# Patient Record
Sex: FEMALE | URBAN - METROPOLITAN AREA
[De-identification: names, ages, dates, MRNs, and addresses within clinical notes are randomized per-mention and may not be internally consistent; named-entity substitution may affect disease eponyms.]

---

## 2024-08-12 ENCOUNTER — OFFICE VISIT (OUTPATIENT)
Dept: URBAN - METROPOLITAN AREA CLINIC 37 | Facility: CLINIC | Age: 79
End: 2024-08-12
Payer: MEDICARE

## 2024-08-12 ENCOUNTER — DASHBOARD ENCOUNTERS (OUTPATIENT)
Age: 79
End: 2024-08-12

## 2024-08-12 VITALS
WEIGHT: 129 LBS | HEART RATE: 84 BPM | BODY MASS INDEX: 22.02 KG/M2 | HEIGHT: 64 IN | OXYGEN SATURATION: 97 % | DIASTOLIC BLOOD PRESSURE: 88 MMHG | SYSTOLIC BLOOD PRESSURE: 128 MMHG

## 2024-08-12 DIAGNOSIS — Z12.11 COLON CANCER SCREENING: ICD-10-CM

## 2024-08-12 DIAGNOSIS — K59.09 INTERMITTENT CONSTIPATION: ICD-10-CM

## 2024-08-12 DIAGNOSIS — R10.13 EPIGASTRIC PAIN: ICD-10-CM

## 2024-08-12 DIAGNOSIS — R68.81 EARLY SATIETY: ICD-10-CM

## 2024-08-12 PROBLEM — 235595009: Status: ACTIVE | Noted: 2024-08-12

## 2024-08-12 PROBLEM — 305058001: Status: ACTIVE | Noted: 2024-08-12

## 2024-08-12 PROBLEM — 14760008: Status: ACTIVE | Noted: 2024-08-12

## 2024-08-12 PROBLEM — 442076002: Status: ACTIVE | Noted: 2024-08-12

## 2024-08-12 PROBLEM — 79922009: Status: ACTIVE | Noted: 2024-08-12

## 2024-08-12 PROCEDURE — 99204 OFFICE O/P NEW MOD 45 MIN: CPT | Performed by: HOSPITALIST

## 2024-08-12 RX ORDER — PANTOPRAZOLE 40 MG/1
TABLET, DELAYED RELEASE ORAL
Qty: 30 TABLET | Status: ACTIVE | COMMUNITY

## 2024-08-12 RX ORDER — SUCRALFATE ORAL 1 G/10ML
10 ML 1 HOUR BEFORE MEALS AND AT BEDTIME ON AN EMPTY STOMACH SUSPENSION ORAL
Qty: 1200 ML | Refills: 0
End: 2024-09-11

## 2024-08-12 RX ORDER — SUCRALFATE ORAL 1 G/10ML
SUSPENSION ORAL
Qty: 420 MILLILITER | Status: ACTIVE | COMMUNITY

## 2024-08-12 RX ORDER — PANTOPRAZOLE 40 MG/1
1 TABLET TABLET, DELAYED RELEASE ORAL ONCE A DAY
Qty: 30 TABLET | Refills: 0

## 2024-08-12 NOTE — HPI-TODAY'S VISIT:
Plan to78-year-old pleasant Indonesian speaking female presents to the gastroenterology clinic to discuss about colon cancer screening and abdominal bloating, early satiety and epigastric pain.  Patient is here along with her son who helps in translating during this encounter.  Patient reports she has epigastric pain, 3 out of 10 in intensity radiating to the areas around epigastric region, triggered by lunch and mostly postprandial associated with abdominal bloating and early satiety.  She also reports intermittent acid reflux symptoms but denies any dysphagia, odynophagia, blood in stool or black tarry stool.  She also reports intermittent constipation with intermittent [place] type I stool and [place] type IV stool pattern.  Family history of liver cancer in her older sister but denies any other family history of GI cancers.  Not on any blood thinners.  Denies any major cardiac, respiratory or nephrology symptoms.  Never had EGD or colonoscopy in the past.  Recently her primary care physician put her on pantoprazole 40 mg daily before eating and sucralfate 4 times daily oral suspension and she reports 70% improvement in her symptoms for the past few weeks.

## 2024-08-12 NOTE — PHYSICAL EXAM GASTROINTESTINAL
Abdomen , soft, mild tenderness noted in the epigastric area, nondistended , no guarding or rigidity , no masses palpable , normal bowel sounds , Liver and Spleen,  no hepatosplenomegaly , liver nontender

## 2024-08-19 ENCOUNTER — LAB OUTSIDE AN ENCOUNTER (OUTPATIENT)
Dept: URBAN - METROPOLITAN AREA CLINIC 37 | Facility: CLINIC | Age: 79
End: 2024-08-19

## 2024-08-27 ENCOUNTER — CLAIMS CREATED FROM THE CLAIM WINDOW (OUTPATIENT)
Dept: URBAN - METROPOLITAN AREA SURGERY CENTER 8 | Facility: SURGERY CENTER | Age: 79
End: 2024-08-27

## 2024-08-27 ENCOUNTER — CLAIMS CREATED FROM THE CLAIM WINDOW (OUTPATIENT)
Dept: URBAN - METROPOLITAN AREA CLINIC 4 | Facility: CLINIC | Age: 79
End: 2024-08-27
Payer: MEDICARE

## 2024-08-27 DIAGNOSIS — K31.7 POLYP OF STOMACH AND DUODENUM: ICD-10-CM

## 2024-08-27 DIAGNOSIS — K29.70 GASTRITIS, UNSPECIFIED, WITHOUT BLEEDING: ICD-10-CM

## 2024-08-27 DIAGNOSIS — K31.89 OTHER DISEASES OF STOMACH AND DUODENUM: ICD-10-CM

## 2024-08-27 PROCEDURE — 88312 SPECIAL STAINS GROUP 1: CPT | Performed by: PATHOLOGY

## 2024-08-27 PROCEDURE — 88305 TISSUE EXAM BY PATHOLOGIST: CPT | Performed by: PATHOLOGY

## 2024-08-27 RX ORDER — PANTOPRAZOLE 40 MG/1
1 TABLET TABLET, DELAYED RELEASE ORAL ONCE A DAY
Qty: 30 TABLET | Refills: 0 | Status: ACTIVE | COMMUNITY

## 2024-08-27 RX ORDER — SUCRALFATE ORAL 1 G/10ML
10 ML 1 HOUR BEFORE MEALS AND AT BEDTIME ON AN EMPTY STOMACH SUSPENSION ORAL
Qty: 1200 ML | Refills: 0 | Status: ACTIVE | COMMUNITY
End: 2024-09-11

## 2024-09-16 ENCOUNTER — OFFICE VISIT (OUTPATIENT)
Dept: URBAN - METROPOLITAN AREA CLINIC 37 | Facility: CLINIC | Age: 79
End: 2024-09-16
Payer: MEDICARE

## 2024-09-16 VITALS
OXYGEN SATURATION: 97 % | WEIGHT: 130 LBS | HEIGHT: 64 IN | SYSTOLIC BLOOD PRESSURE: 136 MMHG | HEART RATE: 80 BPM | DIASTOLIC BLOOD PRESSURE: 86 MMHG | BODY MASS INDEX: 22.2 KG/M2

## 2024-09-16 DIAGNOSIS — K31.A0 GASTRIC INTESTINAL METAPLASIA: ICD-10-CM

## 2024-09-16 DIAGNOSIS — K59.09 CHRONIC CONSTIPATION: ICD-10-CM

## 2024-09-16 DIAGNOSIS — D12.6 ADENOMATOUS POLYP OF COLON, UNSPECIFIED PART OF COLON: ICD-10-CM

## 2024-09-16 PROBLEM — 236069009: Status: ACTIVE | Noted: 2024-09-16

## 2024-09-16 PROBLEM — 72519002: Status: ACTIVE | Noted: 2024-09-16

## 2024-09-16 PROBLEM — 428054006: Status: ACTIVE | Noted: 2024-09-16

## 2024-09-16 PROCEDURE — 99213 OFFICE O/P EST LOW 20 MIN: CPT | Performed by: HOSPITALIST

## 2024-09-16 RX ORDER — PANTOPRAZOLE 40 MG/1
1 TABLET TABLET, DELAYED RELEASE ORAL ONCE A DAY
Qty: 30 TABLET | Refills: 0 | Status: ACTIVE | COMMUNITY

## 2024-09-16 NOTE — HPI-TODAY'S VISIT:
78-year-old female patient presents for follow-up on EGD and Colonoscopy.  Patient had EGD and colonoscopy by me in August 2024.  EGD showed 1 small gastric polyp and biopsies consistent with fundic gland polyp.  Gastric biopsies consistent with focal intestinal metaplasia.  Colonoscopy showed 3 precancerous tubular adenoma and one around 1.5 cm and was recommended to repeat colonoscopy in 3 years for polyp surveillance.  She had mild small diverticulosis but otherwise normal.  Patient has done well following EGD and colonoscopy without any complications.  Patient denies any GI symptoms at this time and has been doing well without any abdominal discomfort, acid reflux or constipation.